# Patient Record
Sex: FEMALE | Employment: OTHER | URBAN - METROPOLITAN AREA
[De-identification: names, ages, dates, MRNs, and addresses within clinical notes are randomized per-mention and may not be internally consistent; named-entity substitution may affect disease eponyms.]

---

## 2019-03-25 ASSESSMENT — MIFFLIN-ST. JEOR
SCORE: 1181.28
SCORE: 1181.28

## 2019-03-27 ENCOUNTER — SURGERY - HEALTHEAST (OUTPATIENT)
Dept: SURGERY | Facility: CLINIC | Age: 74
End: 2019-03-27

## 2019-03-27 ENCOUNTER — HOSPITAL ENCOUNTER (OUTPATIENT)
Dept: SURGERY | Facility: CLINIC | Age: 74
Discharge: HOME | End: 2019-03-27
Attending: PLASTIC SURGERY | Admitting: PLASTIC SURGERY
Payer: COMMERCIAL

## 2019-03-27 DIAGNOSIS — Z09 AFTERCARE INVOLVING THE USE OF PLASTIC SURGERY: ICD-10-CM

## 2019-03-28 LAB
LAB AP CHARGES (HE HISTORICAL CONVERSION): NORMAL
PATH REPORT.COMMENTS IMP SPEC: NORMAL
PATH REPORT.FINAL DX SPEC: NORMAL
PATH REPORT.GROSS SPEC: NORMAL
PATH REPORT.MICROSCOPIC SPEC OTHER STN: NORMAL
PATH REPORT.RELEVANT HX SPEC: NORMAL
RESULT FLAG (HE HISTORICAL CONVERSION): NORMAL

## 2019-03-29 ENCOUNTER — TRANSFERRED RECORDS (OUTPATIENT)
Dept: HEALTH INFORMATION MANAGEMENT | Facility: CLINIC | Age: 74
End: 2019-03-29

## 2019-04-05 ENCOUNTER — TRANSFERRED RECORDS (OUTPATIENT)
Dept: HEALTH INFORMATION MANAGEMENT | Facility: CLINIC | Age: 74
End: 2019-04-05

## 2019-04-10 NOTE — TELEPHONE ENCOUNTER
ONCOLOGY INTAKE: Records Information      APPT INFORMATION:  Referring provider:  Samantha BRAVO  Referring provider s clinic:    Reason for visit/diagnosis:  Ovarian Tumor    RECORDS INFORMATION:  Were the records received with the referral (via [employer])? No, referring clinic is sending on 4/10/2019      Has patient been seen for any external appt for this diagnosis? Hilary BRAVO    If yes, where?     Has patient had any imaging or procedures outside of [facility] for this condition?       If Yes, where?     ADDITIONAL INFORMATION:

## 2019-04-11 NOTE — TELEPHONE ENCOUNTER
12 pahes of med recs rec'd from Hilary BRAVO, faxed to HIM  11:09 AM      RECORDS STATUS - ALL OTHER DIAGNOSIS      RECORDS RECEIVED FROM: Hilary BRAVO   DATE RECEIVED: 05/06/19   NOTES STATUS DETAILS   OFFICE NOTE from referring provider Complete CE 04/05/19 Dr. España   OFFICE NOTE from medical oncologist n/a    DISCHARGE SUMMARY from hospital n/a    DISCHARGE REPORT from the ER n/a    OPERATIVE REPORT Complete CE 04/05/19 hysterectomy  CE 03/27/19 EXCISE MALIGNANT LESION RIGHT PRETIBIAL REGION WITH FROZEN SECTION   MEDICATION LIST Complete CE/Epic   CLINICAL TRIAL TREATMENTS TO DATE n/a    LABS     PATHOLOGY REPORTS Requested CE 04/05/19 [facility]   ANYTHING RELATED TO DIAGNOSIS Complete CE/Epic   GENONOMIC TESTING     TYPE: n/a    IMAGING (NEED IMAGES & REPORT)     CT SCANS Requested [facility]   MRI Requested [facility]   MAMMO n/a    ULTRASOUND Requested Hilary BRAVO   PET n/a        borderline Ovarian Tumor/Samantha España referring Hilary BRAVO

## 2019-04-26 NOTE — TELEPHONE ENCOUNTER
Path requested via fax from [facility]: [phone].    Images requested from [facility]. They will push today (MRI 03/22/19, CT 03/21/19).    Image requested from Hilary BRAVO ( 03/29/19) via fax [phone].    Img's from [facility] & [facility] now resolved, and viewable in PACS.  9:51 AM

## 2019-04-29 NOTE — TELEPHONE ENCOUNTER
Per AH, [facility] called and stated they did not do any of the imaging, and that [facility] did all of them.    Hilary BRAVO called & said the same thing, but verified we have all of the most recent office notes & no path from them (pt had surgery on 4/5 no slides though).    Available images have been received and resolved.

## 2019-04-30 NOTE — TELEPHONE ENCOUNTER
Bx ([facility]) arrived and will be sent up to the [facility] floor Path Lab.

## 2019-05-02 PROCEDURE — 00000346 ZZHCL STATISTIC REVIEW OUTSIDE SLIDES TC 88321: Performed by: OBSTETRICS & GYNECOLOGY

## 2019-05-06 ENCOUNTER — PRE VISIT (OUTPATIENT)
Dept: ONCOLOGY | Facility: CLINIC | Age: 74
End: 2019-05-06

## 2019-05-06 ENCOUNTER — ONCOLOGY VISIT (OUTPATIENT)
Dept: ONCOLOGY | Facility: CLINIC | Age: 74
End: 2019-05-06
Payer: COMMERCIAL

## 2019-05-06 VITALS
OXYGEN SATURATION: 96 % | HEART RATE: 90 BPM | SYSTOLIC BLOOD PRESSURE: 130 MMHG | TEMPERATURE: 97.8 F | WEIGHT: 171.88 LBS | RESPIRATION RATE: 16 BRPM | DIASTOLIC BLOOD PRESSURE: 84 MMHG

## 2019-05-06 DIAGNOSIS — M54.50 ACUTE BILATERAL LOW BACK PAIN WITHOUT SCIATICA: ICD-10-CM

## 2019-05-06 DIAGNOSIS — D39.12 OVARIAN TUMOR OF BORDERLINE MALIGNANCY, LEFT: Primary | ICD-10-CM

## 2019-05-06 PROCEDURE — 99205 OFFICE O/P NEW HI 60 MIN: CPT | Performed by: OBSTETRICS & GYNECOLOGY

## 2019-05-06 ASSESSMENT — PAIN SCALES - GENERAL: PAINLEVEL: MODERATE PAIN (5)

## 2019-05-06 NOTE — PROGRESS NOTES
Consult Notes on Referred Patient    Date: 2019       Dr. Samantha España MD  [facility]  [address] [place] [address] [place] [address]       RE: Margo Ortiz  : 1945  BRENDAN: 2019    Dear Dr. Samantha España:    I had the pleasure of seeing your patient Margo Ortiz here at the Gynecologic Cancer Clinic at the [facility] on 2019.  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  Given these findings she was subsequently sent to the Gynecologic Cancer Clinic for new patient consultation.     To have biopsy of lung tomorrow due to increasing in size nodule 11 mm right side. Having significant back pain across lower back.    HPI:  Had back pain, still has it now, Had a CT scan thought had kidney stones found 8 cm cyst on ovary recommended removal.    3/21/2019: CT abd/pelvis-Impression:   The liver, gallbladder,   pancreas, and adrenal glands appear normal for a noncontrast exam.   Calcified splenic granuloma. Anteverted uterus. 6.9 x 6.3 x 5.5 cm   left adnexal cystic lesion. No dilated loops of small bowel. Normal   caliber appendix. Pancolonic diverticulosis without pericolonic   inflammatory changes. Normal caliber abdominal aorta. Multifocal   atherosclerosis. No free fluid within the abdomen or pelvis. No   definite peritoneal or omental nodularity. No enlarged abdominal or   pelvic lymph nodes.  1. No radiodense urinary tract calculus.  2. 6.9 cm left adnexal cystic lesion, indeterminate. Gynecological   consultation and/or pelvic MRI are recommended.  2. Pancolonic diverticulosis.    3/22/2019: mri pelvis-  Impression: Additional Pelvic Findings: No lymphadenopathy, ascites or fluid   1. Large left and small right ovarian cystic lesions with a few thin   internal septations. No worrisome internal enhancement or nodularity.   Differential includes  benign cystic neoplasm such as cystadenomas or   mildly complex cysts. Recommend gynecologic consultation for   consideration of imaging surveillance or surgical evaluation.  2. Colonic diverticulosis.    4/5/2019 US: 8 cm complex pelvic mass with septation.  Specimen(s) : 1) Pelvic washings from the left side, 2) Pelvic washings from the right side, 3) Cyst fluid, 4) Uterus with cervix and bilateral Fallopian tubes and ovaries, with cyst wall   A. Uterus, cervix, bilateral fallopian tubes and bilateral ovaries-  1. 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  2. Right ovary with physiologic changes.  3. Bilateral fallopian tubes with complete luminal cross-section and no atypia.  4. Chronic cervicitis, leiomyoma and endometrium with cystic change.     B. Left pelvic washings, cytology and cell block-Negative for malignancy.     C. Right pelvic washings, cytology and cell block-Negative for malignancy.     D. Ovarian cyst fluid, cytology and cell block-Negative for malignancy.     Procedure:  Total hysterectomy and bilateral salpingo-oophorectomy    Specimen Integrity: capsule reptured  Tumor Site:  Right ovary    Ovarian Surface Involvement:  absent    Fallopian Tube Surface Involvement:  absent   Tumor Size:  7.2 x 4.0 x 0.8 cm    Histologic Type:  Serous borderline tumor/atypical proliferative serous tumor   Histologic Grade:  Not applicable    Implants:  Not assessed    Other Tissue/Organ Involvement:  Not identified  Largest Extrapelvic Peritoneal Focus:  Not applicable  Peritoneal/Ascitic Fluid:  No malignancy  Pleural Fluid:  Not sampled  Treatment Effect:  Not applicable     Regional Lymph Nodes:  No lymph nodes submitted or found   Tumor block(s) for molecular/send out tests:  A3 and A14     Pathologic Stage Classification (AJCC 8th ed):  pT1c1   Nx    [FIGO stage (2015)  IC1]. Pathologic stage assigned based on the operative report indicating that the cyst ruptured during  surgery.    4/19/2019 CT CHEST-Impression:   1. The groundglass nodule in the anterior right upper lobe is   unchanged in overall size, but the central solid scarlike component   has shown interval mild increase in size, now measuring up to 11 mm   compared to 5 mm previously. Biopsy should be considered for this   solid component.  2. Additional small nodules scattered through both lungs are stable.       Review of Systems:    Systemic           no weight changes; no fever; no chills; no night sweats; no appetite changes  Skin           no rashes, or lesions  Eye           no irritation; no changes in vision  Dacia-Laryngeal           no dysphagia; no hoarseness   Pulmonary    no cough; no shortness of breath  Cardiovascular    no chest pain; no palpitations  Gastrointestinal    no diarrhea; no constipation; no abdominal pain; no changes in bowel  habits; no blood in stool  Genitourinary   no urinary frequency; no urinary urgency; no dysuria; no pain; no abnormal vaginal discharge; no abnormal vaginal bleeding  Breast   no breast discharge; no breast changes; no breast pain  Musculoskeletal    no myalgias; no arthralgias; no back pain  Psychiatric           no depressed mood; no anxiety    Hematologic           no tender lymph nodes; no noticeable swellings or lumps   Endocrine    no hot flashes; no heat/cold intolerance         Neurological   no tremor; no numbness and tingling; no headaches; no difficulty  sleeping      Past Medical History:    Past Medical History:   Diagnosis Date     Arthritis      Hypertension          Past Surgical History:    Past Surgical History:   Procedure Laterality Date     BACK SURGERY       BREAST SURGERY       GENITOURINARY SURGERY       SCCA of the leg, needs reconstruction-right leg  Basal cell carcinoma bridge of nose.  Abdominal surgery-due to adhesive disease 40 years ago low transverse incision    Health Maintenance:  Health Maintenance Due   Topic Date Due     HEPATITIS C  SCREENING  01/15/1965     DTAP/TDAP/TD IMMUNIZATION (1 - Tdap) 01/15/1972     MAMMO SCREEN Q2 YR (SYSTEM ASSIGNED)  01/15/1987     LIPID SCREEN Q5 YR FEMALE (SYSTEM ASSIGNED)  01/15/1992     COLON CANCER SCREEN (SYSTEM ASSIGNED)  01/15/1997     ZOSTER IMMUNIZATION (1 of 2) 01/15/1997     ADVANCE DIRECTIVE PLANNING Q5 YRS  01/15/2002     MEDICARE ANNUAL WELLNESS VISIT  01/15/2012     FALL RISK ASSESSMENT  01/15/2012     DEXA SCAN SCREENING (SYSTEM ASSIGNED)  01/15/2012     PNEUMOCOCCAL IMMUNIZATION 65+ LOW/MEDIUM RISK (1 of 2 - PCV13) 01/15/2012     PHQ-2  2019       Last Pap Smear: 3-4 years ago             Result: normal  She has not had a history of abnormal Pap smears.    Last Mammogram: 2017              Result: repeat required      She has had a history of abnormal mammograms.    Last Colonoscopy: never              Result: not done                    Last DEXA Scan: Not done              Result: not done      Current Medications:     has a current medication list which includes the following prescription(s): hydrochlorothiazide, hydrocodone-acetaminophen, ibuprofen, and oxycodone hcl.       Allergies:   Allergies   Allergen Reactions     Morphine Hcl            Social History:     Social History     Tobacco Use     Smoking status: Former Smoker     Last attempt to quit: 2014     Years since quittin.0     Smokeless tobacco: Never Used   Substance Use Topics     Alcohol use: Not on file   Quit smoking 5-6 years ago.    History   Drug Use Not on file           Family History:     The patient's family history is notable for the following.    Mom-lung cancer, smoker  Father-throat cancer, smoker  Mgm-breast cancer age 43  Sister-pancreatic cancer  41, smoker  No colon, ovarian cancer    Physical Exam:     /84 (BP Location: Right arm)   Pulse 90   Temp 97.8  F (36.6  C) (Oral)   Resp 16   Wt 78 kg (171 lb 14 oz)   LMP 1999 (Approximate)   SpO2 96%   There is no height or weight  on file to calculate BMI.    General Appearance: healthy and alert, no distress     HEENT:  no thyromegaly, no palpable nodules or masses        Cardiovascular: regular rate and rhythm, no gallops, rubs or murmurs     Respiratory: lungs clear, no rales, rhonchi or wheezes, normal diaphragmatic excursion    Musculoskeletal: extremities non tender and without edema    Skin: no lesions or rashes     Neurological: normal gait, no gross defects     Psychiatric: appropriate mood and affect                               Hematological: normal cervical, supraclavicular and inguinal lymph nodes     Gastrointestinal:       abdomen soft, non-tender, non-distended, no organomegaly or masses    Genitourinary: Deferred today      Assessment:    Margo Ortiz is a 74 year old woman with a new diagnosis of borderline tumor of left ovary. At least stage IC1 not staged but ruptured intra operatively. Imaging showed no evidence of metastatic disease. Back pain really bothering her, unclear etiology, hx of prior back surgery per report with Dr. Larios.      Plan:     1.)    Borderline tumor of the ovary, incompletely staged. Discussed risk of this coming back as a low grade serous ovarian cancer or recurrence as an LMP. Discussed options including surgical staging for prognosis vs. Expectant management. Patient does not want another surgery at this time. Would like close follow up. Will have path reread here to ensure we agree with diagnosis of LMP.  Plan MRI back to rule out any disease causing pain. Abdominal pain-recommend ibuprofen 600 mg po q 6 hours x 48 hours with food to see if any improvement. Has diverticula on imaging.  RTC in 1 mos to review everything again with the patient since surgery was just 1 mos ago would not recommend going in now at this time anyway.      2.) Genetic risk factors were assessed and the patient does not clearly meet the qualifications for a referral. Strong smoking history in family.    3.) Labs  and/or tests ordered include:  , MRI lumbar sacral spine, DXA scan     4.) Health maintenance issues addressed today include colonoscopy           Thank you for allowing us to participate in the care of your patient.         Sincerely,  Sarah Clifford MD    Department of Ob/Gyn and Women's Health  Division of Gynecologic Oncology  [facility]  [phone]      CC  No care team member to MISTY Cervantes

## 2019-05-06 NOTE — LETTER
2019         RE: Margo Ortiz  [address] [place] [address]        Dear Colleague,    Thank you for referring your patient, Margo Ortiz, to the [facility]. Please see a copy of my visit note below.                            Consult Notes on Referred Patient    Date: 2019       Dr. Samantha España MD  [facility]  [address]  [place] [address]       RE: Margo Ortiz  : 1945  BRENDAN: 2019    Dear Dr. Samantha España:    I had the pleasure of seeing your patient Margo Ortiz here at the Gynecologic Cancer Clinic at the [facility] on 2019.  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  Given these findings she was subsequently sent to the Gynecologic Cancer Clinic for new patient consultation.     To have biopsy of lung tomorrow due to increasing in size nodule 11 mm right side. Having significant back pain across lower back.    HPI:  Had back pain, still has it now, Had a CT scan thought had kidney stones found 8 cm cyst on ovary recommended removal.    3/21/2019: CT abd/pelvis-Impression:   The liver, gallbladder,   pancreas, and adrenal glands appear normal for a noncontrast exam.   Calcified splenic granuloma. Anteverted uterus. 6.9 x 6.3 x 5.5 cm   left adnexal cystic lesion. No dilated loops of small bowel. Normal   caliber appendix. Pancolonic diverticulosis without pericolonic   inflammatory changes. Normal caliber abdominal aorta. Multifocal   atherosclerosis. No free fluid within the abdomen or pelvis. No   definite peritoneal or omental nodularity. No enlarged abdominal or   pelvic lymph nodes.  1. No radiodense urinary tract calculus.  2. 6.9 cm left adnexal cystic lesion, indeterminate. Gynecological   consultation and/or pelvic MRI are recommended.  2. Pancolonic diverticulosis.    3/22/2019: mri  pelvis-  Impression: Additional Pelvic Findings: No lymphadenopathy, ascites or fluid   1. Large left and small right ovarian cystic lesions with a few thin   internal septations. No worrisome internal enhancement or nodularity.   Differential includes benign cystic neoplasm such as cystadenomas or   mildly complex cysts. Recommend gynecologic consultation for   consideration of imaging surveillance or surgical evaluation.  2. Colonic diverticulosis.    4/5/2019 US: 8 cm complex pelvic mass with septation.  Specimen(s) : 1) Pelvic washings from the left side, 2) Pelvic washings from the right side, 3) Cyst fluid, 4) Uterus with cervix and bilateral Fallopian tubes and ovaries, with cyst wall   A. Uterus, cervix, bilateral fallopian tubes and bilateral ovaries-  1. 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  2. Right ovary with physiologic changes.  3. Bilateral fallopian tubes with complete luminal cross-section and no atypia.  4. Chronic cervicitis, leiomyoma and endometrium with cystic change.     B. Left pelvic washings, cytology and cell block-Negative for malignancy.     C. Right pelvic washings, cytology and cell block-Negative for malignancy.     D. Ovarian cyst fluid, cytology and cell block-Negative for malignancy.     Procedure:  Total hysterectomy and bilateral salpingo-oophorectomy    Specimen Integrity: capsule reptured  Tumor Site:  Right ovary    Ovarian Surface Involvement:  absent    Fallopian Tube Surface Involvement:  absent   Tumor Size:  7.2 x 4.0 x 0.8 cm    Histologic Type:  Serous borderline tumor/atypical proliferative serous tumor   Histologic Grade:  Not applicable    Implants:  Not assessed    Other Tissue/Organ Involvement:  Not identified  Largest Extrapelvic Peritoneal Focus:  Not applicable  Peritoneal/Ascitic Fluid:  No malignancy  Pleural Fluid:  Not sampled  Treatment Effect:  Not applicable     Regional Lymph Nodes:  No lymph nodes submitted or  found   Tumor block(s) for molecular/send out tests:  A3 and A14     Pathologic Stage Classification (AJCC 8th ed):  pT1c1   Nx    [FIGO stage (2015)  IC1]. Pathologic stage assigned based on the operative report indicating that the cyst ruptured during surgery.    4/19/2019 CT CHEST-Impression:   1. The groundglass nodule in the anterior right upper lobe is   unchanged in overall size, but the central solid scarlike component   has shown interval mild increase in size, now measuring up to 11 mm   compared to 5 mm previously. Biopsy should be considered for this   solid component.  2. Additional small nodules scattered through both lungs are stable.       Review of Systems:    Systemic           no weight changes; no fever; no chills; no night sweats; no appetite changes  Skin           no rashes, or lesions  Eye           no irritation; no changes in vision  Dacia-Laryngeal           no dysphagia; no hoarseness   Pulmonary    no cough; no shortness of breath  Cardiovascular    no chest pain; no palpitations  Gastrointestinal    no diarrhea; no constipation; no abdominal pain; no changes in bowel  habits; no blood in stool  Genitourinary   no urinary frequency; no urinary urgency; no dysuria; no pain; no abnormal vaginal discharge; no abnormal vaginal bleeding  Breast   no breast discharge; no breast changes; no breast pain  Musculoskeletal    no myalgias; no arthralgias; no back pain  Psychiatric           no depressed mood; no anxiety    Hematologic           no tender lymph nodes; no noticeable swellings or lumps   Endocrine    no hot flashes; no heat/cold intolerance         Neurological   no tremor; no numbness and tingling; no headaches; no difficulty  sleeping      Past Medical History:    Past Medical History:   Diagnosis Date     Arthritis      Hypertension          Past Surgical History:    Past Surgical History:   Procedure Laterality Date     BACK SURGERY       BREAST SURGERY       GENITOURINARY SURGERY        SCCA of the leg, needs reconstruction-right leg  Basal cell carcinoma bridge of nose.  Abdominal surgery-due to adhesive disease 40 years ago low transverse incision    Health Maintenance:  Health Maintenance Due   Topic Date Due     HEPATITIS C SCREENING  01/15/1965     DTAP/TDAP/TD IMMUNIZATION (1 - Tdap) 01/15/1972     MAMMO SCREEN Q2 YR (SYSTEM ASSIGNED)  01/15/1987     LIPID SCREEN Q5 YR FEMALE (SYSTEM ASSIGNED)  01/15/1992     COLON CANCER SCREEN (SYSTEM ASSIGNED)  01/15/1997     ZOSTER IMMUNIZATION (1 of 2) 01/15/1997     ADVANCE DIRECTIVE PLANNING Q5 YRS  01/15/2002     MEDICARE ANNUAL WELLNESS VISIT  01/15/2012     FALL RISK ASSESSMENT  01/15/2012     DEXA SCAN SCREENING (SYSTEM ASSIGNED)  01/15/2012     PNEUMOCOCCAL IMMUNIZATION 65+ LOW/MEDIUM RISK (1 of 2 - PCV13) 01/15/2012     PHQ-2  2019       Last Pap Smear: 3-4 years ago             Result: normal  She has not had a history of abnormal Pap smears.    Last Mammogram: 2017              Result: repeat required      She has had a history of abnormal mammograms.    Last Colonoscopy: never              Result: not done                    Last DEXA Scan: Not done              Result: not done      Current Medications:     has a current medication list which includes the following prescription(s): hydrochlorothiazide, hydrocodone-acetaminophen, ibuprofen, and oxycodone hcl.       Allergies:   Allergies   Allergen Reactions     Morphine Hcl            Social History:     Social History     Tobacco Use     Smoking status: Former Smoker     Last attempt to quit: 2014     Years since quittin.0     Smokeless tobacco: Never Used   Substance Use Topics     Alcohol use: Not on file   Quit smoking 5-6 years ago.    History   Drug Use Not on file           Family History:     The patient's family history is notable for the following.    Mom-lung cancer, smoker  Father-throat cancer, smoker  Mgm-breast cancer age 43  Sister-pancreatic cancer   41, smoker  No colon, ovarian cancer    Physical Exam:     /84 (BP Location: Right arm)   Pulse 90   Temp 97.8  F (36.6  C) (Oral)   Resp 16   Wt 78 kg (171 lb 14 oz)   LMP 05/06/1999 (Approximate)   SpO2 96%   There is no height or weight on file to calculate BMI.    General Appearance: healthy and alert, no distress     HEENT:  no thyromegaly, no palpable nodules or masses        Cardiovascular: regular rate and rhythm, no gallops, rubs or murmurs     Respiratory: lungs clear, no rales, rhonchi or wheezes, normal diaphragmatic excursion    Musculoskeletal: extremities non tender and without edema    Skin: no lesions or rashes     Neurological: normal gait, no gross defects     Psychiatric: appropriate mood and affect                               Hematological: normal cervical, supraclavicular and inguinal lymph nodes     Gastrointestinal:       abdomen soft, non-tender, non-distended, no organomegaly or masses    Genitourinary: Deferred today      Assessment:    Margo Ortiz is a 74 year old woman with a new diagnosis of borderline tumor of left ovary. At least stage IC1 not staged but ruptured intra operatively. Imaging showed no evidence of metastatic disease. Back pain really bothering her, unclear etiology, hx of prior back surgery per report with Dr. Larios.      Plan:     1.)    Borderline tumor of the ovary, incompletely staged. Discussed risk of this coming back as a low grade serous ovarian cancer or recurrence as an LMP. Discussed options including surgical staging for prognosis vs. Expectant management. Patient does not want another surgery at this time. Would like close follow up. Will have path reread here to ensure we agree with diagnosis of LMP.  Plan MRI back to rule out any disease causing pain. Abdominal pain-recommend ibuprofen 600 mg po q 6 hours x 48 hours with food to see if any improvement. Has diverticula on imaging.  RTC in 1 mos to review everything again with the patient  since surgery was just 1 mos ago would not recommend going in now at this time anyway.      2.) Genetic risk factors were assessed and the patient does not clearly meet the qualifications for a referral. Strong smoking history in family.    3.) Labs and/or tests ordered include:  , MRI lumbar sacral spine, DXA scan     4.) Health maintenance issues addressed today include colonoscopy           Thank you for allowing us to participate in the care of your patient.         Sincerely,  Sarah Clifford MD    Department of Ob/Gyn and Women's Health  Division of Gynecologic Oncology  [facility]  [phone]        No care team member to display  MISTY PIZARRO      Again, thank you for allowing me to participate in the care of your patient.        Sincerely,        Sarah Clifford MD

## 2019-05-06 NOTE — NURSING NOTE
Oncology Rooming Note    May 6, 2019 10:06 AM   Margo Ortiz is a 74 year old female who presents for:    Chief Complaint   Patient presents with     Oncology Clinic Visit     New Patient     Initial Vitals: /84 (BP Location: Right arm)   Pulse 90   Temp 97.8  F (36.6  C) (Oral)   Resp 16   Wt 78 kg (171 lb 14 oz)   LMP 05/06/1999 (Approximate)   SpO2 96%  There is no height or weight on file to calculate BMI. There is no height or weight on file to calculate BSA.  Moderate Pain (5) Comment: Data Unavailable   Patient's last menstrual period was 05/06/1999 (approximate).  Allergies reviewed: Yes  Medications reviewed: Yes    Medications: Medication refills not needed today.  Pharmacy name entered into EPIC: Data Unavailable    Sondra Hussein LPN

## 2019-05-08 ENCOUNTER — TELEPHONE (OUTPATIENT)
Dept: ONCOLOGY | Facility: CLINIC | Age: 74
End: 2019-05-08

## 2019-05-08 LAB — COPATH REPORT: NORMAL

## 2019-05-08 NOTE — TELEPHONE ENCOUNTER
I contacted pt to schedule colonoscopy. Informed me that Pt went in for her biopsy with Dr Stockton and it caused her lung to collapse.  She will be at [facility] for a day or two. She would like to do the colonoscopy at [facility]. Wanted to update you as to what had happened to her.

## 2019-05-10 ENCOUNTER — TELEPHONE (OUTPATIENT)
Dept: ONCOLOGY | Facility: CLINIC | Age: 74
End: 2019-05-10

## 2019-05-10 NOTE — TELEPHONE ENCOUNTER
Received voice message from Chelsie Hardin RN, Lung Cancer coordinator. Wanted to provide update for Dr Clifford. Patient has new diagnosis of adenocarcinoma of lung. Pulmonologist is Dr Robert Stockton, who feels she will be a surgical candidate. She is being worked up with PET and breathing tests. Please call with any questions.  Mary Carmen Silva RN, BSN, OCN

## 2019-07-09 ENCOUNTER — TELEPHONE (OUTPATIENT)
Dept: PULMONOLOGY | Facility: CLINIC | Age: 74
End: 2019-07-09

## 2019-07-09 NOTE — TELEPHONE ENCOUNTER
ONCOLOGY INTAKE: Records Information      APPT INFORMATION:  Referring provider:  Dr. Addie Torres MD  Referring provider s clinic:  [facility] and vascular [place]  Reason for visit/diagnosis:  Adenocarcinoma of lung/lung nodule  Has patient been notified of appointment date and time?: no, left message    RECORDS INFORMATION:  Were the records received with the referral (via [employer])? No, phone call from referring office    Has patient been seen for any external appt for this diagnosis? yes    If yes, where? Notes care everywhere, [facility]    Has patient had any imaging or procedures outside of [place] for this condition? yes      If Yes, where? Notes care everywhere, [facility]    ADDITIONAL INFORMATION:  Left message for patient, provided hours/phone number.  Will try again on 7/10/2019 if appointment is not scheduled.    Notes are in care everywhere.  Had seen Dr. Clifford for different diagnosis in the past.

## 2019-07-24 ENCOUNTER — TELEPHONE (OUTPATIENT)
Dept: ONCOLOGY | Facility: CLINIC | Age: 74
End: 2019-07-24

## 2019-07-24 ENCOUNTER — PATIENT OUTREACH (OUTPATIENT)
Dept: ONCOLOGY | Facility: CLINIC | Age: 74
End: 2019-07-24

## 2019-07-24 NOTE — PROGRESS NOTES
Received update from cancer center scheduling team that patient will be following with a Medical Oncologist with [facility], due to recently diagnosed breast cancer.  Patient has decided to see Gynecologic Oncology through [facility] as well, instead of continuing to follow with Dr. Clifford.  Noted from visit with Dr. Clifford faxed to [facility] office as per patient's request.    Ty Barahona RN, BSN, OCN

## 2019-07-24 NOTE — TELEPHONE ENCOUNTER
I spoke with Chelsie from [facility] 2 times, she called the patient  about seeing an Oncologist. The patient did not think she needed an oncologist because the Surgeon told her she did not have any in her Lymphnodes. Chelsie Explained to her she needs to see an Oncologist. The patient decided she wants to go to [facility] so she is going to send the records from [facility] to them. The patient also requested we send Gyn Onc records so she can see a Gyn Onc there as well since Dr Clifford is leaving [place]. I requested Aren to send the records.

## 2021-07-27 VITALS
WEIGHT: 150 LBS | HEIGHT: 65 IN | HEIGHT: 65 IN | BODY MASS INDEX: 24.99 KG/M2 | BODY MASS INDEX: 24.99 KG/M2 | WEIGHT: 150 LBS

## 2021-07-27 NOTE — OP NOTE
Date of operation: 27/19  Surgeon: Jose Alfredo Weinstein  Preoperative diagnosis: Previous Mohs resection of the right pretibial region with a persistent positive margin inferiorly  Postoperative diagnosis: Free margin by frozen section of the right pretibial Mohs defect  Procedure performed: Reexcision of positive margin of Mohs defect with frozen section verification  Specimen: [place] of skin from the inferior margin of the Mohs defect  Anesthesia: 1% lidocaine with 1 100,000 epinephrine  Blood loss: 5 cc  Technique: The patient's dermatologist had confirmed that there was a positive margin following the Mohs resection of between 6:00 and 9:00.  The patient had been unable to obtain transportation to return to the dermatologist for a second layer.  Following a discussion of the difficulty of pretibial defect and the inability to close it primarily I told her that we first needed to make sure that the cancer was removed and then would address her wound closure with possible skin grafting or healing by secondary intent in the future.  A crescent of skin was marked from 6:00 to 9:00 and the area infiltrated with local anesthetic.  Following a sterile prep and drape the scalpel was used to excise this crescent oriented with a suture at 6:00 and send the specimen to pathology.  A subsequent frozen section diagnosis with tumor free margins was obtained and the wound checked for hemostasis and a sterile dressing applied.  There were no complications and the patient tolerated the procedure well.  The dimensions of the crescent removed were 2.0 x 0.8 cm.
